# Patient Record
Sex: MALE | Race: WHITE | NOT HISPANIC OR LATINO | URBAN - METROPOLITAN AREA
[De-identification: names, ages, dates, MRNs, and addresses within clinical notes are randomized per-mention and may not be internally consistent; named-entity substitution may affect disease eponyms.]

---

## 2021-03-18 ENCOUNTER — EMERGENCY (EMERGENCY)
Facility: HOSPITAL | Age: 31
LOS: 1 days | Discharge: HOME | End: 2021-03-18
Attending: EMERGENCY MEDICINE
Payer: COMMERCIAL

## 2021-03-18 VITALS
HEIGHT: 67 IN | DIASTOLIC BLOOD PRESSURE: 96 MMHG | HEART RATE: 99 BPM | WEIGHT: 179.9 LBS | RESPIRATION RATE: 16 BRPM | TEMPERATURE: 98 F | SYSTOLIC BLOOD PRESSURE: 151 MMHG | OXYGEN SATURATION: 99 %

## 2021-03-18 VITALS
TEMPERATURE: 98 F | OXYGEN SATURATION: 99 % | DIASTOLIC BLOOD PRESSURE: 69 MMHG | HEART RATE: 77 BPM | RESPIRATION RATE: 18 BRPM | SYSTOLIC BLOOD PRESSURE: 135 MMHG

## 2021-03-18 LAB
ALBUMIN SERPL ELPH-MCNC: 5.1 G/DL — SIGNIFICANT CHANGE UP (ref 3.3–5.2)
ALP SERPL-CCNC: 91 U/L — SIGNIFICANT CHANGE UP (ref 40–120)
ALT FLD-CCNC: 42 U/L — HIGH
ANION GAP SERPL CALC-SCNC: 12 MMOL/L — SIGNIFICANT CHANGE UP (ref 5–17)
AST SERPL-CCNC: 32 U/L — SIGNIFICANT CHANGE UP
BASOPHILS # BLD AUTO: 0.04 K/UL — SIGNIFICANT CHANGE UP (ref 0–0.2)
BASOPHILS NFR BLD AUTO: 0.6 % — SIGNIFICANT CHANGE UP (ref 0–2)
BILIRUB SERPL-MCNC: <0.2 MG/DL — LOW (ref 0.4–2)
BUN SERPL-MCNC: 17 MG/DL — SIGNIFICANT CHANGE UP (ref 8–20)
CALCIUM SERPL-MCNC: 9.6 MG/DL — SIGNIFICANT CHANGE UP (ref 8.6–10.2)
CHLORIDE SERPL-SCNC: 101 MMOL/L — SIGNIFICANT CHANGE UP (ref 98–107)
CO2 SERPL-SCNC: 27 MMOL/L — SIGNIFICANT CHANGE UP (ref 22–29)
CREAT SERPL-MCNC: 0.93 MG/DL — SIGNIFICANT CHANGE UP (ref 0.5–1.3)
D DIMER BLD IA.RAPID-MCNC: <150 NG/ML DDU — SIGNIFICANT CHANGE UP
EOSINOPHIL # BLD AUTO: 0.12 K/UL — SIGNIFICANT CHANGE UP (ref 0–0.5)
EOSINOPHIL NFR BLD AUTO: 1.7 % — SIGNIFICANT CHANGE UP (ref 0–6)
GLUCOSE SERPL-MCNC: 78 MG/DL — SIGNIFICANT CHANGE UP (ref 70–99)
HCT VFR BLD CALC: 47.4 % — SIGNIFICANT CHANGE UP (ref 39–50)
HGB BLD-MCNC: 15.2 G/DL — SIGNIFICANT CHANGE UP (ref 13–17)
IMM GRANULOCYTES NFR BLD AUTO: 0.3 % — SIGNIFICANT CHANGE UP (ref 0–1.5)
LIDOCAIN IGE QN: 75 U/L — HIGH (ref 22–51)
LYMPHOCYTES # BLD AUTO: 3.31 K/UL — HIGH (ref 1–3.3)
LYMPHOCYTES # BLD AUTO: 46.1 % — HIGH (ref 13–44)
MCHC RBC-ENTMCNC: 29.4 PG — SIGNIFICANT CHANGE UP (ref 27–34)
MCHC RBC-ENTMCNC: 32.1 GM/DL — SIGNIFICANT CHANGE UP (ref 32–36)
MCV RBC AUTO: 91.7 FL — SIGNIFICANT CHANGE UP (ref 80–100)
MONOCYTES # BLD AUTO: 0.59 K/UL — SIGNIFICANT CHANGE UP (ref 0–0.9)
MONOCYTES NFR BLD AUTO: 8.2 % — SIGNIFICANT CHANGE UP (ref 2–14)
NEUTROPHILS # BLD AUTO: 3.1 K/UL — SIGNIFICANT CHANGE UP (ref 1.8–7.4)
NEUTROPHILS NFR BLD AUTO: 43.1 % — SIGNIFICANT CHANGE UP (ref 43–77)
PLATELET # BLD AUTO: 328 K/UL — SIGNIFICANT CHANGE UP (ref 150–400)
POTASSIUM SERPL-MCNC: 4.4 MMOL/L — SIGNIFICANT CHANGE UP (ref 3.5–5.3)
POTASSIUM SERPL-SCNC: 4.4 MMOL/L — SIGNIFICANT CHANGE UP (ref 3.5–5.3)
PROT SERPL-MCNC: 7.7 G/DL — SIGNIFICANT CHANGE UP (ref 6.6–8.7)
RBC # BLD: 5.17 M/UL — SIGNIFICANT CHANGE UP (ref 4.2–5.8)
RBC # FLD: 11.8 % — SIGNIFICANT CHANGE UP (ref 10.3–14.5)
SODIUM SERPL-SCNC: 140 MMOL/L — SIGNIFICANT CHANGE UP (ref 135–145)
TROPONIN T SERPL-MCNC: <0.01 NG/ML — SIGNIFICANT CHANGE UP (ref 0–0.06)
WBC # BLD: 7.18 K/UL — SIGNIFICANT CHANGE UP (ref 3.8–10.5)
WBC # FLD AUTO: 7.18 K/UL — SIGNIFICANT CHANGE UP (ref 3.8–10.5)

## 2021-03-18 PROCEDURE — 71045 X-RAY EXAM CHEST 1 VIEW: CPT | Mod: 26

## 2021-03-18 PROCEDURE — 99285 EMERGENCY DEPT VISIT HI MDM: CPT

## 2021-03-18 PROCEDURE — 71045 X-RAY EXAM CHEST 1 VIEW: CPT

## 2021-03-18 PROCEDURE — 80053 COMPREHEN METABOLIC PANEL: CPT

## 2021-03-18 PROCEDURE — 84484 ASSAY OF TROPONIN QUANT: CPT

## 2021-03-18 PROCEDURE — 85025 COMPLETE CBC W/AUTO DIFF WBC: CPT

## 2021-03-18 PROCEDURE — 36415 COLL VENOUS BLD VENIPUNCTURE: CPT

## 2021-03-18 PROCEDURE — 96374 THER/PROPH/DIAG INJ IV PUSH: CPT

## 2021-03-18 PROCEDURE — 85379 FIBRIN DEGRADATION QUANT: CPT

## 2021-03-18 PROCEDURE — 83690 ASSAY OF LIPASE: CPT

## 2021-03-18 PROCEDURE — 99284 EMERGENCY DEPT VISIT MOD MDM: CPT | Mod: 25

## 2021-03-18 PROCEDURE — 93005 ELECTROCARDIOGRAM TRACING: CPT

## 2021-03-18 PROCEDURE — 93010 ELECTROCARDIOGRAM REPORT: CPT

## 2021-03-18 RX ORDER — FAMOTIDINE 10 MG/ML
20 INJECTION INTRAVENOUS ONCE
Refills: 0 | Status: COMPLETED | OUTPATIENT
Start: 2021-03-18 | End: 2021-03-18

## 2021-03-18 RX ADMIN — Medication 30 MILLILITER(S): at 22:45

## 2021-03-18 RX ADMIN — FAMOTIDINE 20 MILLIGRAM(S): 10 INJECTION INTRAVENOUS at 22:45

## 2021-03-18 NOTE — ED PROVIDER NOTE - NSFOLLOWUPINSTRUCTIONS_ED_ALL_ED_FT
Continue follow up with your cardiologist.   Return to the ER with any new, worsening or persistent symptoms.

## 2021-03-18 NOTE — ED ADULT NURSE NOTE - OBJECTIVE STATEMENT
Patient A&Ox4 complaining of palpitations, and left arm and jaw discomfort.  Pt states palpitations resolved.  Cardiac monitoring and pulse oximetry initiated, NSR on monitor. Pt reports following a cardiologist and is scheduled for a stress test tomorrow.  Pt states episode of palpitations occur mostly at night.  Denies SOB, N/V, diaphoresis, slurred speech, HA, blurred vision.  NAD noted, respirations even and unlabored.  Safety precautions in place.  Plan of care explained, pt verbalized understanding. MD Curry at bedside for eval.

## 2021-03-18 NOTE — ED ADULT TRIAGE NOTE - CHIEF COMPLAINT QUOTE
pt with c/o heaviness in left arm, bilat jaw pain, believes the symptoms started some time yesterday. reports hx arrhythmia, follows with a cardiologist. states it happens during the night.

## 2021-03-18 NOTE — ED PROVIDER NOTE - OBJECTIVE STATEMENT
30yom w/ no PMH has been having episodes of palpitations and discomfort in the chest for several weeks, usually happens at night, lasts a few seconds and resolves. Tonight he was having the same symptoms but associated with pain in the left arm and tightness in the jaw as well. Has an ongoing cardiac evaluation with Dr. Araujo, so far had echocardiogram and holter, and due for stress test tomorrow. Does endorse some relation to his diet but has not tried any antacids.

## 2021-03-18 NOTE — ED PROVIDER NOTE - CLINICAL SUMMARY MEDICAL DECISION MAKING FREE TEXT BOX
Ongoing episodes of self resolved palpitations and chest discomfort, today with left arm and jaw discomfort. Episodes occur mostly at night, does relate it to heavy food intake or alcohol sometimes, has not tried any antacids. Has ongoing work up with cardiology, due for stress test tomorrow. Labs all WNL, troponin negative, d-dimer negative. Low suspicion this is ACS. Possibly GERD. Will keep his follow up tomorrow. Return precautions provided. Ongoing episodes of self resolved palpitations and chest discomfort, today with left arm and jaw discomfort. Episodes occur mostly at night, does relate it to heavy food intake or alcohol sometimes, has not tried any antacids. Has ongoing work up with cardiology, due for stress test tomorrow. Labs all WNL, ECG normal rhythm with no ischemic changes, troponin negative, d-dimer negative. Low suspicion this is ACS. Possibly GERD. Will keep his follow up tomorrow. Return precautions provided.

## 2021-03-18 NOTE — ED STATDOCS - PROGRESS NOTE DETAILS
30yoM; now p/w episodes left arm pain, palpitations and lower jaw pain onset last night that last for 2-3 minutes. Pt experience these episodes before sleeping. Pt has been following up with a cardiologist and has a stress test scheduled tomorrow.   Focused eval, protocol orders entered. Pt to be moved to main ED for further evaluation by another provider.

## 2021-03-18 NOTE — ED PROVIDER NOTE - PATIENT PORTAL LINK FT
You can access the FollowMyHealth Patient Portal offered by [facility] by registering at the following website: [email or web address]. By joining [email or web address] FollowMyHealth portal, you will also be able to view your health information using other applications (apps) compatible with our system.

## 2021-05-28 NOTE — ED ADULT NURSE NOTE - ED COMFORT CARE
[FreeTextEntry1] : CTA chest 3/6/17 revealed: root 4.5 x 4.3cm, ascending 4.6cm, arch 3.3cm, proximal descending 4.5 x 4.4cm\par \par Echocardiogram 11/22/19:\par 1. Mild concentric left ventricular hypertrophy. There is grade II diastolic dysfunction. \par 2. There is a bioprosthetic aortic valve replacement. The mean aortic pressure gradient is 23 mmHg. The peak aortic pressure gradient is 39 mmHg. The calculated aortic valve area is 1.0 cm2. The aortic valve dimensionless valve index is.17. The left ventricle stroke volume index is 26 ml/m2. The left ventricular outflow diameter is 2.4 cm. There is mild aortic regurgitation. status post mitral valve repair. The mean pressure gradient is 2 mmHG. The peak pressure gradient is 5 mmHg. There is mild mitral regurgitation. \par 3. No pericardial effusion. \par 4. The ascending aorta is dilated at 4.3 cm. \par \par CTA coronaries and chest 11/1/19:\par 1. The calcium score was not done. \par 2. Minimal stenosis in proximal LAD and mid LAD. \par 3. The rest of the coronary segments are normal. \par 4. Left dominant circulation with the LAD supplying the PDA territory. \par 5. S/p bioprosthetic aortic valve replacement. \par 6. S/p mitral valve annuloplasty. \par 7. Right sided pacer leads in place.\par 8. Cardiovascular: There is again thoracic aortic aneurysm, with aortic root measuring 4.8 x 4.6 cm, ascending aorta 4.6 x 4.6 cm, aortic arch 3.4 x 3.7 cm, and descending aorta 4.6 x 4.4 cm. Small calcified plaque thoracic aorta. No evidence of aortic dissection. Bovine aortic arch.\par 9. Evidence of pulmonary hypertension, with dilated main pulmonary artery, thickening of the right \par ventricular free wall, and right atrial enlargement.\par \par \par TTE 1/25/21:\par Preserved right and reduced left ventricular systolic function\par Moderate concentric left ventricular hypertrophy. \par There is a pacemaker/defibrillator wire seen in the right ventricle.\par Normal Chamber sizes\par Prosthetic aortic valve, NITESH 1.5 cm2, mean gradient 10 mm Hg, peak gradient 17 mm Hg\par s/p mitral valve repair\par There is moderate tricuspid regurgitation. There is mild pulmonary hypertension.\par The aortic root (4.9 cm) and ascending aorta (4.8 cm) are dilated.\par No pericardial effusion \par Compared to a study from Nov 2019, LVEF is lower now and aortic root slightly larger. \par  Patient informed